# Patient Record
Sex: FEMALE | Race: OTHER | NOT HISPANIC OR LATINO
[De-identification: names, ages, dates, MRNs, and addresses within clinical notes are randomized per-mention and may not be internally consistent; named-entity substitution may affect disease eponyms.]

---

## 2020-06-15 PROBLEM — Z00.00 ENCOUNTER FOR PREVENTIVE HEALTH EXAMINATION: Status: ACTIVE | Noted: 2020-06-15

## 2020-06-16 ENCOUNTER — APPOINTMENT (OUTPATIENT)
Dept: NEUROLOGY | Facility: CLINIC | Age: 20
End: 2020-06-16
Payer: COMMERCIAL

## 2020-06-16 PROCEDURE — 99205 OFFICE O/P NEW HI 60 MIN: CPT | Mod: 95

## 2020-06-16 RX ORDER — VALPROIC ACID 250 MG/1
250 CAPSULE, LIQUID FILLED ORAL
Qty: 6 | Refills: 0 | Status: ACTIVE | COMMUNITY
Start: 2020-06-16

## 2020-06-16 NOTE — PHYSICAL EXAM
[FreeTextEntry1] : The patient is alert and oriented x3, naming intact x3, repetition normal, follows three-step commands, and is able to participate fully in the history taking.\par Speech is normal with no evidence of dysarthria.\par \par Memory is intact: Immediate recall 3 out of 3, short-term 3 out of 3, remote memory intact\par \par Cranial nerves II through XII -  intact \par \par Motor exam: Upper and lower extremities grossly normal  power, No abnormal movements noted.\par \par Coordination and vestibular exam: Finger to nose intact, no evidence of truncal or appendicular ataxia. No evidence of nystagmus. \par \par Gait: Normal stance and gait.\par

## 2020-06-16 NOTE — HISTORY OF PRESENT ILLNESS
[FreeTextEntry1] : 20 years old with \par wed mild headache - \par Thursday on the right side -  worse friday night and it got really bad and started throwing up. \par stays pretty hydrated. \par nop sig alcohol\par No swelling in the leg \par went to [facility]  - had a CT scan that showed a possible right \par \par MRV shows clot in the superior sagital and confluence of sinus and \par \par All got tested at the end of May and that was negative. \par WBC 13.2 ( Lymph - 2.3) \par D- Dimer  - 627 ( normal on June 6th)\par Factor S activity - 57 \par  \par prior to that patient had test of protein S activity - 45 at gyn office)\par MTHFR compound heterozygote - both mutations \par b12 -276\par Beta2 glycoprotein negative \par ACL negative\par \par \par \par LE DVT - negative\par \par \par On Nicolasa - since Feb 2020 - no issues prior to june

## 2020-06-16 NOTE — ASSESSMENT
[FreeTextEntry1] : Patient with acute large CVT with accompanied headache, nausea and phonophobia in the setting of protein S deficiency , MTHFR heterozygous state and low B12 and recent Nicolasa start with a family history of CVT ( mother ) and no evidence of COVID in patient (PCR and AB negative)\par \par Continue Lovenox 80 mg daily\par B12 sl daily\par valproic acid 250 qhs\par Tylenol for breakthrough pain. \par Follow up with hematology\par Stop Nicolasa.

## 2020-06-16 NOTE — REASON FOR VISIT
[Home] : at home, [unfilled] , at the time of the visit. [Mother] : mother [Medical Office: ([facility])___] : at the medical office located in  [Father] : father [Verbal consent obtained from patient] : the patient, [unfilled] [Consultation] : a consultation visit

## 2020-06-17 RX ORDER — BUTALBITAL, ACETAMINOPHEN, CAFFEINE AND CODEINE PHOSPHATE 50; 325; 40; 30 MG/1; MG/1; MG/1; MG/1
50-325-40-30 CAPSULE ORAL 4 TIMES DAILY
Qty: 80 | Refills: 0 | Status: ACTIVE | COMMUNITY
Start: 2020-06-17 | End: 1900-01-01

## 2020-06-17 RX ORDER — CYANOCOBALAMIN (VITAMIN B-12) 2500 MCG
100-5000 TABLET, SUBLINGUAL SUBLINGUAL
Qty: 30 | Refills: 5 | Status: ACTIVE | COMMUNITY
Start: 2020-06-16 | End: 1900-01-01

## 2020-07-07 ENCOUNTER — APPOINTMENT (OUTPATIENT)
Dept: NEUROLOGY | Facility: CLINIC | Age: 20
End: 2020-07-07
Payer: COMMERCIAL

## 2020-07-07 ENCOUNTER — TRANSCRIPTION ENCOUNTER (OUTPATIENT)
Age: 20
End: 2020-07-07

## 2020-07-07 PROCEDURE — 99214 OFFICE O/P EST MOD 30 MIN: CPT | Mod: 95

## 2020-07-07 NOTE — REASON FOR VISIT
[Home] : at home, [unfilled] , at the time of the visit. [Other Location: e.g. Home (Enter Location, City,State)___] : at [unfilled] [Father] : father [Mother] : mother [Consultation] : a consultation visit [Verbal consent obtained from patient] : the patient, [unfilled]

## 2020-07-07 NOTE — HISTORY OF PRESENT ILLNESS
[FreeTextEntry1] : interval course: \par Patient with acute large CVT with accompanied headache, nausea and phonophobia in the setting of protein S deficiency , MTHFR heterozygous state and low B12 and recent Nicolasa start with a family history of CVT ( mother ) and no evidence of COVID in patient (PCR and AB negative) here for follow up.\par \par Feels like she is better. Off fioricet and tylenol. On VPA BID. \par Energy levels  - still feeling tired. Doing bike rides. \par \par \par Saw hematologist yesterday. \par Still taking lovenox. 1.5 mg/kg \par \par \par HPI: 20 years old with \par wed mild headache - \par Thursday on the right side -  worse friday night and it got really bad and started throwing up. \par stays pretty hydrated. \par nop sig alcohol\par No swelling in the leg \par went to [place] [facility]  - had a CT scan that showed a possible right \par \par MRV shows clot in the superior sagital and confluence of sinus and \par \par All got tested at the end of May and that was negative. \par WBC 13.2 ( Lymph - 2.3) \par D- Dimer  - 627 ( normal on June 6th)\par Factor S activity - 57 \par  \par prior to that patient had test of protein S activity - 45 at gyn office)\par MTHFR compound heterozygote - both mutations \par b12 -276\par Beta2 glycoprotein negative \par ACL negative\par \par \par \par LE DVT - negative\par \par \par On Nicolasa - since Feb 2020 - no issues prior to june

## 2020-07-07 NOTE — ASSESSMENT
[FreeTextEntry1] : Patient with acute large CVT with accompanied headache, nausea and phonophobia in the setting of protein S deficiency , MTHFR heterozygous state and low B12 and recent Nicolasa start with a family history of CVT ( mother ) and no evidence of COVID in patient (PCR and AB negative)\par \par Continue Lovenox 80 mg daily\par B12 sl daily\par valproic acid 250 qhs\par Tylenol for breakthrough pain. \par Follow up with hematology\par Stop VPA\par Follow up MRV of brain at end of July.

## 2020-10-16 ENCOUNTER — APPOINTMENT (OUTPATIENT)
Dept: NEUROLOGY | Facility: CLINIC | Age: 20
End: 2020-10-16
Payer: COMMERCIAL

## 2020-10-16 DIAGNOSIS — R51.9 HEADACHE, UNSPECIFIED: ICD-10-CM

## 2020-10-16 PROCEDURE — 99214 OFFICE O/P EST MOD 30 MIN: CPT | Mod: 95

## 2020-10-16 RX ORDER — VALPROIC ACID 250 MG/1
250 CAPSULE, LIQUID FILLED ORAL AT BEDTIME
Qty: 30 | Refills: 0 | Status: DISCONTINUED | COMMUNITY
Start: 2020-06-16 | End: 2020-10-16

## 2020-10-16 NOTE — HISTORY OF PRESENT ILLNESS
[FreeTextEntry1] : Patient is doing well. No headaches. \par Cont Vit D\par Stop magnessium \par No need for fioricet. \par \par Bruising with lovenox shots .

## 2020-10-19 RX ORDER — ENOXAPARIN SODIUM 100 MG/ML
80 INJECTION SUBCUTANEOUS DAILY
Qty: 30 | Refills: 5 | Status: ACTIVE | COMMUNITY
Start: 2020-06-16 | End: 1900-01-01

## 2020-11-17 ENCOUNTER — APPOINTMENT (OUTPATIENT)
Dept: MRI IMAGING | Facility: CLINIC | Age: 20
End: 2020-11-17

## 2021-06-23 ENCOUNTER — OUTPATIENT (OUTPATIENT)
Dept: OUTPATIENT SERVICES | Facility: HOSPITAL | Age: 21
LOS: 1 days | End: 2021-06-23
Payer: COMMERCIAL

## 2021-06-23 ENCOUNTER — APPOINTMENT (OUTPATIENT)
Dept: MRI IMAGING | Facility: HOSPITAL | Age: 21
End: 2021-06-23
Payer: COMMERCIAL

## 2021-06-23 ENCOUNTER — RESULT REVIEW (OUTPATIENT)
Age: 21
End: 2021-06-23

## 2021-06-23 PROCEDURE — 70544 MR ANGIOGRAPHY HEAD W/O DYE: CPT | Mod: 26

## 2021-06-23 PROCEDURE — 70544 MR ANGIOGRAPHY HEAD W/O DYE: CPT

## 2021-06-25 ENCOUNTER — NON-APPOINTMENT (OUTPATIENT)
Age: 21
End: 2021-06-25

## 2021-07-21 ENCOUNTER — APPOINTMENT (OUTPATIENT)
Dept: NEUROLOGY | Facility: CLINIC | Age: 21
End: 2021-07-21
Payer: COMMERCIAL

## 2021-07-21 DIAGNOSIS — G08 INTRACRANIAL AND INTRASPINAL PHLEBITIS AND THROMBOPHLEBITIS: ICD-10-CM

## 2021-07-21 PROCEDURE — 99214 OFFICE O/P EST MOD 30 MIN: CPT | Mod: 95
